# Patient Record
Sex: MALE | Race: WHITE | Employment: FULL TIME | URBAN - METROPOLITAN AREA
[De-identification: names, ages, dates, MRNs, and addresses within clinical notes are randomized per-mention and may not be internally consistent; named-entity substitution may affect disease eponyms.]

---

## 2017-12-26 ENCOUNTER — HOSPITAL ENCOUNTER (EMERGENCY)
Age: 67
Discharge: HOME | End: 2017-12-26
Attending: EMERGENCY MEDICINE
Payer: COMMERCIAL

## 2017-12-26 ENCOUNTER — APPOINTMENT (OUTPATIENT)
Dept: GENERAL RADIOLOGY | Age: 67
End: 2017-12-26
Attending: NURSE PRACTITIONER
Payer: COMMERCIAL

## 2017-12-26 VITALS
SYSTOLIC BLOOD PRESSURE: 138 MMHG | BODY MASS INDEX: 28.23 KG/M2 | RESPIRATION RATE: 18 BRPM | OXYGEN SATURATION: 100 % | DIASTOLIC BLOOD PRESSURE: 90 MMHG | HEART RATE: 72 BPM | TEMPERATURE: 97.7 F | WEIGHT: 220 LBS | HEIGHT: 74 IN

## 2017-12-26 DIAGNOSIS — R45.89 FEELING ANXIOUS: ICD-10-CM

## 2017-12-26 DIAGNOSIS — R07.89 CHEST DISCOMFORT: Primary | ICD-10-CM

## 2017-12-26 LAB
ALBUMIN SERPL-MCNC: 3.6 G/DL (ref 3.5–5)
ALBUMIN/GLOB SERPL: 1.1 {RATIO} (ref 1.1–2.2)
ALP SERPL-CCNC: 106 U/L (ref 45–117)
ALT SERPL-CCNC: 43 U/L (ref 12–78)
ANION GAP SERPL CALC-SCNC: 7 MMOL/L (ref 5–15)
AST SERPL-CCNC: 31 U/L (ref 15–37)
ATRIAL RATE: 72 BPM
BASOPHILS # BLD: 0 K/UL (ref 0–0.1)
BASOPHILS NFR BLD: 0 % (ref 0–1)
BILIRUB SERPL-MCNC: 0.4 MG/DL (ref 0.2–1)
BUN SERPL-MCNC: 25 MG/DL (ref 6–20)
BUN/CREAT SERPL: 28 (ref 12–20)
CALCIUM SERPL-MCNC: 8.3 MG/DL (ref 8.5–10.1)
CALCULATED P AXIS, ECG09: 91 DEGREES
CALCULATED R AXIS, ECG10: 78 DEGREES
CALCULATED T AXIS, ECG11: 26 DEGREES
CHLORIDE SERPL-SCNC: 109 MMOL/L (ref 97–108)
CO2 SERPL-SCNC: 26 MMOL/L (ref 21–32)
CREAT SERPL-MCNC: 0.9 MG/DL (ref 0.7–1.3)
DIAGNOSIS, 93000: NORMAL
EOSINOPHIL # BLD: 0.1 K/UL (ref 0–0.4)
EOSINOPHIL NFR BLD: 2 % (ref 0–7)
ERYTHROCYTE [DISTWIDTH] IN BLOOD BY AUTOMATED COUNT: 12.7 % (ref 11.5–14.5)
GLOBULIN SER CALC-MCNC: 3.3 G/DL (ref 2–4)
GLUCOSE SERPL-MCNC: 127 MG/DL (ref 65–100)
HCT VFR BLD AUTO: 42.8 % (ref 36.6–50.3)
HGB BLD-MCNC: 14.3 G/DL (ref 12.1–17)
LYMPHOCYTES # BLD: 1.1 K/UL (ref 0.8–3.5)
LYMPHOCYTES NFR BLD: 15 % (ref 12–49)
MCH RBC QN AUTO: 32.6 PG (ref 26–34)
MCHC RBC AUTO-ENTMCNC: 33.4 G/DL (ref 30–36.5)
MCV RBC AUTO: 97.5 FL (ref 80–99)
MONOCYTES # BLD: 0.6 K/UL (ref 0–1)
MONOCYTES NFR BLD: 8 % (ref 5–13)
NEUTS SEG # BLD: 5.5 K/UL (ref 1.8–8)
NEUTS SEG NFR BLD: 75 % (ref 32–75)
P-R INTERVAL, ECG05: 198 MS
PLATELET # BLD AUTO: 168 K/UL (ref 150–400)
POTASSIUM SERPL-SCNC: 4.1 MMOL/L (ref 3.5–5.1)
PROT SERPL-MCNC: 6.9 G/DL (ref 6.4–8.2)
Q-T INTERVAL, ECG07: 392 MS
QRS DURATION, ECG06: 80 MS
QTC CALCULATION (BEZET), ECG08: 429 MS
RBC # BLD AUTO: 4.39 M/UL (ref 4.1–5.7)
SODIUM SERPL-SCNC: 142 MMOL/L (ref 136–145)
TROPONIN I SERPL-MCNC: <0.04 NG/ML
VENTRICULAR RATE, ECG03: 72 BPM
WBC # BLD AUTO: 7.4 K/UL (ref 4.1–11.1)

## 2017-12-26 PROCEDURE — 93005 ELECTROCARDIOGRAM TRACING: CPT

## 2017-12-26 PROCEDURE — 36415 COLL VENOUS BLD VENIPUNCTURE: CPT | Performed by: EMERGENCY MEDICINE

## 2017-12-26 PROCEDURE — 85025 COMPLETE CBC W/AUTO DIFF WBC: CPT | Performed by: EMERGENCY MEDICINE

## 2017-12-26 PROCEDURE — 71020 XR CHEST PA LAT: CPT

## 2017-12-26 PROCEDURE — 99284 EMERGENCY DEPT VISIT MOD MDM: CPT

## 2017-12-26 PROCEDURE — 80053 COMPREHEN METABOLIC PANEL: CPT | Performed by: EMERGENCY MEDICINE

## 2017-12-26 PROCEDURE — 84484 ASSAY OF TROPONIN QUANT: CPT | Performed by: EMERGENCY MEDICINE

## 2017-12-26 NOTE — DISCHARGE INSTRUCTIONS
Anxiety Disorder: Care Instructions  Your Care Instructions    Anxiety is a normal reaction to stress. Difficult situations can cause you to have symptoms such as sweaty palms and a nervous feeling. In an anxiety disorder, the symptoms are far more severe. Constant worry, muscle tension, trouble sleeping, nausea and diarrhea, and other symptoms can make normal daily activities difficult or impossible. These symptoms may occur for no reason, and they can affect your work, school, or social life. Medicines, counseling, and self-care can all help. Follow-up care is a key part of your treatment and safety. Be sure to make and go to all appointments, and call your doctor if you are having problems. It's also a good idea to know your test results and keep a list of the medicines you take. How can you care for yourself at home? · Take medicines exactly as directed. Call your doctor if you think you are having a problem with your medicine. · Go to your counseling sessions and follow-up appointments. · Recognize and accept your anxiety. Then, when you are in a situation that makes you anxious, say to yourself, \"This is not an emergency. I feel uncomfortable, but I am not in danger. I can keep going even if I feel anxious. \"  · Be kind to your body:  ¨ Relieve tension with exercise or a massage. ¨ Get enough rest.  ¨ Avoid alcohol, caffeine, nicotine, and illegal drugs. They can increase your anxiety level and cause sleep problems. ¨ Learn and do relaxation techniques. See below for more about these techniques. · Engage your mind. Get out and do something you enjoy. Go to a funny movie, or take a walk or hike. Plan your day. Having too much or too little to do can make you anxious. · Keep a record of your symptoms. Discuss your fears with a good friend or family member, or join a support group for people with similar problems. Talking to others sometimes relieves stress.   · Get involved in social groups, or volunteer to help others. Being alone sometimes makes things seem worse than they are. · Get at least 30 minutes of exercise on most days of the week to relieve stress. Walking is a good choice. You also may want to do other activities, such as running, swimming, cycling, or playing tennis or team sports. Relaxation techniques  Do relaxation exercises 10 to 20 minutes a day. You can play soothing, relaxing music while you do them, if you wish. · Tell others in your house that you are going to do your relaxation exercises. Ask them not to disturb you. · Find a comfortable place, away from all distractions and noise. · Lie down on your back, or sit with your back straight. · Focus on your breathing. Make it slow and steady. · Breathe in through your nose. Breathe out through either your nose or mouth. · Breathe deeply, filling up the area between your navel and your rib cage. Breathe so that your belly goes up and down. · Do not hold your breath. · Breathe like this for 5 to 10 minutes. Notice the feeling of calmness throughout your whole body. As you continue to breathe slowly and deeply, relax by doing the following for another 5 to 10 minutes:  · Tighten and relax each muscle group in your body. You can begin at your toes and work your way up to your head. · Imagine your muscle groups relaxing and becoming heavy. · Empty your mind of all thoughts. · Let yourself relax more and more deeply. · Become aware of the state of calmness that surrounds you. · When your relaxation time is over, you can bring yourself back to alertness by moving your fingers and toes and then your hands and feet and then stretching and moving your entire body. Sometimes people fall asleep during relaxation, but they usually wake up shortly afterward. · Always give yourself time to return to full alertness before you drive a car or do anything that might cause an accident if you are not fully alert.  Never play a relaxation tape while you drive a car. When should you call for help? Call 911 anytime you think you may need emergency care. For example, call if:  ? · You feel you cannot stop from hurting yourself or someone else. ? Keep the numbers for these national suicide hotlines: 0-418-018-TALK (5-716.877.1670) and 1-792-EBSCBCV (4-641.982.6756). If you or someone you know talks about suicide or feeling hopeless, get help right away. ? Watch closely for changes in your health, and be sure to contact your doctor if:  ? · You have anxiety or fear that affects your life. ? · You have symptoms of anxiety that are new or different from those you had before. Where can you learn more? Go to http://francisco-isael.info/. Enter P754 in the search box to learn more about \"Anxiety Disorder: Care Instructions. \"  Current as of: May 12, 2017  Content Version: 11.4  © 6967-5972 [employer]. Care instructions adapted under license by [employer] (which disclaims liability or warranty for this information). If you have questions about a medical condition or this instruction, always ask your healthcare professional. [address] any warranty or liability for your use of this information.

## 2017-12-26 NOTE — ED PROVIDER NOTES
HPI Comments: The patient is a 78 y.o. male with past medical history significant for a-fib s/p cardioversion, ablation and mini maze as well as anxiety who presents from home with chief complaint of elevated BP and anxiety. Patient states that he has an history of atrial fibrillation status post cardioversion but Dr. Yisroel Denver at [facility]. he has been off of Xarelto for 1 yesr. He has been in sinus rhythm in the last year. States he has been feeling more anxious and has \"prickly sensation\" around his neck, arms and feet. The patient called his primary care provider who could not see him before Friday. The patient went to the drugstore and had taken his blood pressure with a machine there and his blood pressure was 174/50. Patient was concerned about this and came to the ED for further evaluation. Patient states he's been taking his medications as scheduled. Patient does admit to drinking more alcohol beverages and smoking cigars since Thanksgiving at festive occasions. Patient denies: fevers, chills, nausea, vomiting, diarrhea, constipation, loss of bowel or bladder, chest pain, shortness of breath or dyspnea on exertion. Nothing makes the symptoms better or worse. There are no other acute medical concerns at this time. PCP: Corey Braun MD      The history is provided by the patient. No  was used. Past Medical History:   Diagnosis Date    Atrial fibrillation [facility])     Essential hypertension        Past Surgical History:   Procedure Laterality Date    CARDIOVERSION EXTERNAL  2/6/2015         HX CATARACT REMOVAL      HX CERVICAL FUSION      c3    HX KNEE REPLACEMENT      right         No family history on file. Social History     Social History    Marital status:      Spouse name: N/A    Number of children: N/A    Years of education: N/A     Occupational History    Not on file.      Social History Main Topics    Smoking status: Current Some Day Smoker Packs/day: 1.00     Types: Cigars     Last attempt to quit: 4/1/2013    Smokeless tobacco: Never Used      Comment: quit cigarettes 2 years ago    Alcohol use 0.0 oz/week      Comment: 2 drinks daily    Drug use: No    Sexual activity: Not on file     Other Topics Concern    Not on file     Social History Narrative         ALLERGIES: Review of patient's allergies indicates no known allergies. Review of Systems   Constitutional: Negative for appetite change, chills and fever. HENT: Negative. Respiratory: Negative for cough (productive upon awakening), shortness of breath and wheezing. Cardiovascular: Negative for chest pain, palpitations and leg swelling. Gastrointestinal: Negative for abdominal pain, constipation, diarrhea, nausea and vomiting. Genitourinary: Negative for dysuria and urgency. Musculoskeletal: Negative for back pain. Skin: Negative for color change and rash. Neurological: Negative for dizziness and headaches. Psychiatric/Behavioral: The patient is nervous/anxious. All other systems reviewed and are negative. Vitals:    12/26/17 1029 12/26/17 1100   BP: (!) 164/91 151/89   Pulse: 75 70   Resp: 16 18   Temp: 97.7 °F (36.5 °C)    SpO2: 99% 99%   Weight: 99.8 kg (220 lb)    Height: 6' 2\" (1.88 m)             Physical Exam   Constitutional: He is oriented to person, place, and time. He appears well-developed and well-nourished. HENT:   Head: Normocephalic and atraumatic. Neck: Normal range of motion. Neck supple. Cardiovascular: Normal rate, regular rhythm, normal heart sounds and intact distal pulses. Pulmonary/Chest: Effort normal and breath sounds normal. No respiratory distress. He has no wheezes. He has no rales. He exhibits no tenderness. Abdominal: Soft. Bowel sounds are normal. There is no tenderness. There is no guarding. Musculoskeletal: Normal range of motion. Neurological: He is alert and oriented to person, place, and time.    Skin: Skin is warm and dry. No erythema. Psychiatric: He has a normal mood and affect. His behavior is normal. Judgment and thought content normal.   Nursing note and vitals reviewed. MDM  Number of Diagnoses or Management Options  Chest discomfort:   Feeling anxious:     ED Course     Assessment & Plan:   CXR PA/LAT  EKG/Trop-I  CMP  CBC with DIFF    Discussed with Dr. Anthony Aceves MD    Cathie Mckeon NP  12/26/17  11:22 AM    Procedures    ED EKG interpretation:10:36 AM  Rhythm: normal sinus rhythm; and regular . Rate (approx.): 72; Axis: normal; P wave: normal; QRS interval: normal ; ST/T wave: Non-specific change in ST segment in Inferior leads ; Other findings: normal. This EKG was interpreted by Anthony Aceves MD,ED Provider. Labs abd CXR negative    Follow-up with PCP at scheduled appointment later this week. 12:18 PM  The patient has been reevaluated. The patient is ready for discharge. The patient's signs, symptoms, diagnosis, and discharge instructions have been discussed and the patient/ family has conveyed their understanding. The patient is to follow up as recommended or return to the ED should their symptoms worsen. Plan has been discussed and the patient is in agreement. LABORATORY TESTS:  Recent Results (from the past 12 hour(s))   EKG, 12 LEAD, INITIAL    Collection Time: 12/26/17 10:35 AM   Result Value Ref Range    Ventricular Rate 72 BPM    Atrial Rate 72 BPM    P-R Interval 198 ms    QRS Duration 80 ms    Q-T Interval 392 ms    QTC Calculation (Bezet) 429 ms    Calculated P Axis 91 degrees    Calculated R Axis 78 degrees    Calculated T Axis 26 degrees    Diagnosis       Normal sinus rhythm  When compared with ECG of 06-FEB-2015 07:37,  AZ interval has decreased  Non-specific change in ST segment in Inferior leads     CBC WITH AUTOMATED DIFF    Collection Time: 12/26/17 10:38 AM   Result Value Ref Range    WBC 7.4 4.1 - 11.1 K/uL    RBC 4.39 4. 10 - 5.70 M/uL    HGB 14.3 12.1 - 17.0 g/dL HCT 42.8 36.6 - 50.3 %    MCV 97.5 80.0 - 99.0 FL    MCH 32.6 26.0 - 34.0 PG    MCHC 33.4 30.0 - 36.5 g/dL    RDW 12.7 11.5 - 14.5 %    PLATELET 746 931 - 423 K/uL    NEUTROPHILS 75 32 - 75 %    LYMPHOCYTES 15 12 - 49 %    MONOCYTES 8 5 - 13 %    EOSINOPHILS 2 0 - 7 %    BASOPHILS 0 0 - 1 %    ABS. NEUTROPHILS 5.5 1.8 - 8.0 K/UL    ABS. LYMPHOCYTES 1.1 0.8 - 3.5 K/UL    ABS. MONOCYTES 0.6 0.0 - 1.0 K/UL    ABS. EOSINOPHILS 0.1 0.0 - 0.4 K/UL    ABS. BASOPHILS 0.0 0.0 - 0.1 K/UL   METABOLIC PANEL, COMPREHENSIVE    Collection Time: 12/26/17 10:38 AM   Result Value Ref Range    Sodium 142 136 - 145 mmol/L    Potassium 4.1 3.5 - 5.1 mmol/L    Chloride 109 (H) 97 - 108 mmol/L    CO2 26 21 - 32 mmol/L    Anion gap 7 5 - 15 mmol/L    Glucose 127 (H) 65 - 100 mg/dL    BUN 25 (H) 6 - 20 MG/DL    Creatinine 0.90 0.70 - 1.30 MG/DL    BUN/Creatinine ratio 28 (H) 12 - 20      GFR est AA >60 >60 ml/min/1.73m2    GFR est non-AA >60 >60 ml/min/1.73m2    Calcium 8.3 (L) 8.5 - 10.1 MG/DL    Bilirubin, total 0.4 0.2 - 1.0 MG/DL    ALT (SGPT) 43 12 - 78 U/L    AST (SGOT) 31 15 - 37 U/L    Alk. phosphatase 106 45 - 117 U/L    Protein, total 6.9 6.4 - 8.2 g/dL    Albumin 3.6 3.5 - 5.0 g/dL    Globulin 3.3 2.0 - 4.0 g/dL    A-G Ratio 1.1 1.1 - 2.2     TROPONIN I    Collection Time: 12/26/17 10:38 AM   Result Value Ref Range    Troponin-I, Qt. <0.04 <0.05 ng/mL       IMAGING RESULTS:  XR CHEST PA LAT   Final Result        Xr Chest Pa Lat    Result Date: 12/26/2017  EXAM:  XR CHEST PA LAT. INDICATION: Chest pain. COMPARISON: 4/1/2013. FINDINGS: PA and lateral radiographs of the chest were obtained. There is a left atrial appendage clip. The patient is on a cardiac monitor. Lungs: The lungs are clear of mass, nodule, airspace disease or edema. Pleura: There is no pleural effusion or pneumothorax. Mediastinum: The cardiac and mediastinal contours and pulmonary vascularity are normal. Bones and soft tissues:  The bones and soft tissues are within normal limits. IMPRESSION: No acute abnormality. MEDICATIONS GIVEN:  Medications - No data to display    IMPRESSION:  1. Chest discomfort    2. Feeling anxious        PLAN:  1. Current Discharge Medication List        2.    Follow-up Information     Follow up With Details Comments Contact Info    Corey Braun MD  Keep appointment this week Patient can only remember the practice name and not the physician      [address], [facility] DEP  As needed, If symptoms worsen [address]  [phone]            Return to ED for new or worsening symptoms       Mima Pierson NP

## 2017-12-26 NOTE — ED TRIAGE NOTES
Pt reports having elevated blood pressure since Thanksgiving. Pt states he also has HX of atrial fib but has not had an occurrence of it for over a year. Pt denies chest pain or SOB.

## 2017-12-26 NOTE — ED NOTES
Back from x-ray, sitting on stretcher anxious to be discharged. Pt reminded that we are waiting for x-ray results at this time. No complaints currently.

## 2017-12-26 NOTE — Clinical Note
Thank you for allowing us to care for you today. Please follow-up with your Primary Care provider in the next 2-3 days if your symptoms do not improve. Plan for home: Follow-up with primary care as scheduled later this week. Take your bloo d pressure medications on schedule and don't miss doses.   
 
Come back to the ED if you have worsening chest pain, headache not made better by tylenol or ibuprofen or a fluttering feeling in your chest.

## 2017-12-26 NOTE — ED NOTES
Discharge instructions reviewed with and given to pt by ER mid-level. No complaints, no obvious distress noted, ambulatory on own accord.

## 2024-02-16 PROBLEM — M19.011 OSTEOARTHRITIS OF RIGHT SHOULDER REGION: Status: ACTIVE | Noted: 2024-02-16

## 2024-03-20 ENCOUNTER — HOSPITAL ENCOUNTER (OUTPATIENT)
Facility: HOSPITAL | Age: 74
Discharge: HOME | End: 2024-03-23
Attending: ORTHOPAEDIC SURGERY
Payer: MEDICARE

## 2024-03-20 DIAGNOSIS — M19.011 LOCALIZED OSTEOARTHRITIS OF RIGHT SHOULDER: ICD-10-CM

## 2024-03-20 PROCEDURE — 73200 CT UPPER EXTREMITY W/O DYE: CPT

## 2024-04-25 ENCOUNTER — HOSPITAL ENCOUNTER (OUTPATIENT)
Facility: HOSPITAL | Age: 74
Discharge: HOME | End: 2024-04-25
Payer: MEDICARE

## 2024-04-25 VITALS
BODY MASS INDEX: 25.96 KG/M2 | SYSTOLIC BLOOD PRESSURE: 97 MMHG | HEART RATE: 71 BPM | TEMPERATURE: 97.5 F | DIASTOLIC BLOOD PRESSURE: 61 MMHG | HEIGHT: 75 IN | WEIGHT: 208.78 LBS

## 2024-04-25 LAB
APPEARANCE UR: CLEAR
BACTERIA URNS QL MICRO: NEGATIVE /HPF
BILIRUB UR QL: NEGATIVE
COLOR UR: ABNORMAL
EPITH CASTS URNS QL MICRO: ABNORMAL /LPF
GLUCOSE UR STRIP.AUTO-MCNC: >1000 MG/DL
HGB UR QL STRIP: NEGATIVE
HYALINE CASTS URNS QL MICRO: ABNORMAL /LPF (ref 0–5)
KETONES UR QL STRIP.AUTO: ABNORMAL MG/DL
LEUKOCYTE ESTERASE UR QL STRIP.AUTO: NEGATIVE
NITRITE UR QL STRIP.AUTO: NEGATIVE
PH UR STRIP: 5.5 (ref 5–8)
PROT UR STRIP-MCNC: NEGATIVE MG/DL
RBC #/AREA URNS HPF: ABNORMAL /HPF (ref 0–5)
SP GR UR REFRACTOMETRY: 1.03 (ref 1–1.03)
URINE CULTURE IF INDICATED: ABNORMAL
UROBILINOGEN UR QL STRIP.AUTO: 0.2 EU/DL (ref 0.2–1)
WBC URNS QL MICRO: ABNORMAL /HPF (ref 0–4)

## 2024-04-25 PROCEDURE — 86900 BLOOD TYPING SEROLOGIC ABO: CPT

## 2024-04-25 PROCEDURE — 36415 COLL VENOUS BLD VENIPUNCTURE: CPT

## 2024-04-25 PROCEDURE — 86901 BLOOD TYPING SEROLOGIC RH(D): CPT

## 2024-04-25 PROCEDURE — 86850 RBC ANTIBODY SCREEN: CPT

## 2024-04-25 PROCEDURE — APPNB30 APP NON BILLABLE TIME 0-30 MINS: Performed by: NURSE PRACTITIONER

## 2024-04-25 PROCEDURE — 81001 URINALYSIS AUTO W/SCOPE: CPT

## 2024-04-25 RX ORDER — LANOLIN ALCOHOL/MO/W.PET/CERES
400 CREAM (GRAM) TOPICAL EVERY MORNING
COMMUNITY

## 2024-04-25 RX ORDER — OMEPRAZOLE 40 MG/1
40 CAPSULE, DELAYED RELEASE ORAL EVERY MORNING
COMMUNITY

## 2024-04-25 RX ORDER — ATORVASTATIN CALCIUM 20 MG/1
20 TABLET, FILM COATED ORAL EVERY OTHER DAY
COMMUNITY

## 2024-04-25 RX ORDER — FLUTICASONE FUROATE, UMECLIDINIUM BROMIDE AND VILANTEROL TRIFENATATE 100; 62.5; 25 UG/1; UG/1; UG/1
1 POWDER RESPIRATORY (INHALATION) EVERY MORNING
COMMUNITY

## 2024-04-25 RX ORDER — SIMETHICONE 80 MG
80 TABLET,CHEWABLE ORAL EVERY 6 HOURS PRN
COMMUNITY

## 2024-04-25 RX ORDER — LORAZEPAM 1 MG/1
1 TABLET ORAL NIGHTLY
COMMUNITY

## 2024-04-25 RX ORDER — ASPIRIN 81 MG/1
81 TABLET, CHEWABLE ORAL EVERY MORNING
COMMUNITY

## 2024-04-25 RX ORDER — VITAMIN B COMPLEX
1 CAPSULE ORAL DAILY
COMMUNITY

## 2024-04-25 RX ORDER — TADALAFIL 5 MG/1
5 TABLET ORAL DAILY
COMMUNITY

## 2024-04-25 RX ORDER — LISINOPRIL 30 MG/1
30 TABLET ORAL EVERY MORNING
COMMUNITY

## 2024-04-25 RX ORDER — FLUTICASONE PROPIONATE 50 MCG
1 SPRAY, SUSPENSION (ML) NASAL EVERY MORNING
COMMUNITY

## 2024-04-25 RX ORDER — GUAIFENESIN 600 MG/1
1200 TABLET, EXTENDED RELEASE ORAL 2 TIMES DAILY PRN
COMMUNITY

## 2024-04-25 NOTE — PERIOP NOTE
[address] [address]   MAIN OR                     [phone]    MAIN PRE OP             [phone]                                                                                AMBULATORY PRE OP          [phone]  PRE-ADMISSION TESTING    [phone]     Surgery Date:  05/03/2024       Is surgery arrival time given by surgeon?  YES      If “NO”, [facility] staff will call you between 4 and 7pm the day before your surgery with your arrival time. (If your surgery is on a Monday, we will call you the Friday before.)    Call [phone] after 7pm Monday-Friday if you did not receive this call.    INSTRUCTIONS BEFORE YOUR SURGERY   When You  Arrive Arrive at [facility] Patient Access on 1st floor the day of your surgery.  Have your insurance card, photo ID,living will/advanced directive/POA (if applicable),  and any copayment (if needed)   Food   and   Drink NO solid food after midnight the night before surgery. You can drink clear liquids from midnight until ONE hour prior to your arrival at the hospital on the day of your surgery. Clear liquids include:  Water  Fruit juices without pulp  Carbonated beverages  Black coffee(no cream/milk)  Tea(no cream/milk)  Gatorade    No alcohol (beer, wine, liquor) or marijuana (smoking) 24 hours, edibles (3 days). Stop smoking cigarettes 14 days before surgery (helps w/healing and breathing).   Medications to   TAKE   Morning of Surgery MEDICATIONS TO TAKE THE MORNING OF SURGERY WITH A SIP OF WATER:   OMEPRAZOLE, TAMSULOSIN, METOPROLOL, TRELEGY, FLONASE  You may take these medications, IF NEEDED, the morning of surgery: GAS X   Medications to STOP  before surgery Non-Steroidal anti-inflammatory Drugs (NSAID's): for example, Ibuprofen (Advil, Motrin), Naproxen (Aleve) 3 days  STOP all herbal supplements and vitamins(unless prescribed by your doctor), and fish oil for 7 days  Other:STOP JARDIANCE 4/30,  germs on your skin you will need to shower with CHG soap (Chorhexidine gluconate 4%) two times before surgery.    CHG soap (Hibiclens, Hex-A-Clens or store brand)  CHG soap will be provided at your Preadmission Testing (PAT) appointment.  If you do not have a PAT appointment before surgery, you may arrange to  CHG soap from our office or purchase CHG soap at a pharmacy, grocery or department store.  You need to purchase TWO 4 ounce bottles to use for your 2 showers.    Steps to follow:  Wash your hair with your normal shampoo and your body with regular soap and rinse well to remove shampoo and soap from your skin.  Wet a clean washcloth and turn off the shower.  Put CHG soap on washcloth and apply to your entire body from the neck down. Do not use on your head, face or private parts(genitals). Do not use CHG soap on open sores, wounds or areas of skin irritation.  Wash you body gently for 5 minutes. Do not wash your skin too hard. This soap does not create lather. Pay special attention to your underarms and from your belly button to your feet.  Turn the shower back on and rinse well to get CHG soap off your body.  Pat your skin dry with a clean, dry towel. Do not apply lotions or moisturizer.  Put on clean clothes and sleep on fresh bed sheets and do not allow pets to sleep with you.    Shower with CHG soap 2 times before your surgery  The evening before your surgery  The morning of your surgery      Tips to help prevent infections after your surgery:  Protect your surgical wound from germs:  Hand washing is the most important thing you and your caregivers can do to prevent infections.  Keep your bandage clean and dry!  Do not touch your surgical wound.  Use clean, freshly washed towels and washcloths every time you shower; do not share bath linens with others.  Until your surgical wound is healed, wear clothing and sleep on bed linens each day that are clean and freshly washed.  Do not allow pets to sleep in

## 2024-04-26 LAB
ABO + RH BLD: NORMAL
BACTERIA SPEC CULT: NORMAL
BACTERIA SPEC CULT: NORMAL
BLOOD GROUP ANTIBODIES SERPL: NORMAL
SERVICE CMNT-IMP: NORMAL
SPECIMEN EXP DATE BLD: NORMAL

## 2024-04-26 NOTE — PERIOP NOTE
PAT Nurse Practitioner   Pre-Operative Chart Review/Assessment:-ORTHOPEDIC                Patient Name:  Amrit Mendoza                                                           Age:   73 y.o.    :  1950     Today's Date:  2024     Date of PAT:   24      Date of Surgery:    5/3/24      Procedure(s):  Right Reverse Total Shoulder Arthroplasty     Anesthesia:   General w/ block     Surgeon:   Dr. Albright                       PLAN:      1)  PCP:  Juan Freeman MD      2)  Cardiac Clearance:  Pt followed by LALO Fernandez(U). LOV 24. Perioperative risk stratification completed. Notes and testing in CE.       3)  Diabetic Treatment Consult:  Not indicated. A1c-5.5      4)  Sleep Apnea evaluation:   Not indicated. VITALIY Score 3.       5) Treatment for MRSA/Staph Aureus:  ***      6) Discharge Plan:  Home w/ s/o. Pt lives alone.      7) Additional Concerns:  HTN, GERD, A-fib s/p mini maze & watchman, HFpEF, COPD, anxiety      8) Special Med Recs:  Hold tadalafil 5 days PTS. Hold empagliflozin 4 days PTS. Hold aspirin and lisinopril DOS. Take BB DOS.      Additional Orders for Day of Surgery:  none                Vital Signs:        Vitals:    24 1410   BP: 97/61   Pulse: 71   Temp: 97.5 °F (36.4 °C)             Body mass index is 26.1 kg/m².         ____________________________________________  PAST MEDICAL HISTORY  Past Medical History:   Diagnosis Date    Atrial fibrillation (HCC)     Atrial flutter (HCC)     Chronic diastolic (congestive) heart failure (HCC)     COPD (chronic obstructive pulmonary disease) (HCC)     GERD (gastroesophageal reflux disease)     Hypertension       ____________________________________________  PAST SURGICAL HISTORY  Past Surgical History:   Procedure Laterality Date    CARDIAC SURGERY      MINI MAZE    CERVICAL FUSION      KNEE ARTHROPLASTY Right     KNEE ARTHROSCOPY Right       ____________________________________________  HOME  intended for automatic decolonization interventions per se as regimens are not sufficiently effective to warrant routine use.            Skin:     Denies open wounds, cuts, sores, rashes or other areas of concern in PAT assessment.          AMIE GALEAS - NP  Available via [employer]

## 2024-04-29 PROBLEM — Z01.818 ENCOUNTER FOR PREADMISSION TESTING: Status: ACTIVE | Noted: 2024-04-29
